# Patient Record
Sex: MALE | Race: WHITE | NOT HISPANIC OR LATINO | Employment: UNEMPLOYED | URBAN - METROPOLITAN AREA
[De-identification: names, ages, dates, MRNs, and addresses within clinical notes are randomized per-mention and may not be internally consistent; named-entity substitution may affect disease eponyms.]

---

## 2017-08-16 ENCOUNTER — HOSPITAL ENCOUNTER (EMERGENCY)
Facility: MEDICAL CENTER | Age: 47
End: 2017-08-16
Attending: EMERGENCY MEDICINE
Payer: MEDICAID

## 2017-08-16 VITALS
TEMPERATURE: 98.9 F | HEIGHT: 70 IN | WEIGHT: 170 LBS | OXYGEN SATURATION: 93 % | HEART RATE: 98 BPM | DIASTOLIC BLOOD PRESSURE: 69 MMHG | RESPIRATION RATE: 16 BRPM | SYSTOLIC BLOOD PRESSURE: 100 MMHG | BODY MASS INDEX: 24.34 KG/M2

## 2017-08-16 DIAGNOSIS — F10.920 ALCOHOL INTOXICATION, UNCOMPLICATED (HCC): ICD-10-CM

## 2017-08-16 DIAGNOSIS — R45.4 FEELING ANGRY: ICD-10-CM

## 2017-08-16 PROCEDURE — 99283 EMERGENCY DEPT VISIT LOW MDM: CPT

## 2017-08-16 ASSESSMENT — ENCOUNTER SYMPTOMS
FEVER: 0
ABDOMINAL PAIN: 0
FLANK PAIN: 0
VOMITING: 0

## 2017-08-16 ASSESSMENT — LIFESTYLE VARIABLES: SUBSTANCE_ABUSE: 1

## 2017-08-17 NOTE — ED NOTES
"Augustus Brumfield  47 y.o.  Male BIB EMS with   Chief Complaint   Patient presents with   • Alcohol Intoxication     PT denies any medical complaints, admits to alcohol use today.  In NAD.  /69 mmHg  Pulse 98  Temp(Src) 37.2 °C (98.9 °F)  Resp 16  Ht 1.778 m (5' 10\")  Wt 77.111 kg (170 lb)  BMI 24.39 kg/m2  SpO2 93%  Will continue to monitor.   "
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
## 2017-10-30 ENCOUNTER — HOSPITAL ENCOUNTER (EMERGENCY)
Dept: HOSPITAL 8 - ED | Age: 47
Discharge: LEFT BEFORE DISCHARGE | End: 2017-10-30
Payer: MEDICAID

## 2017-10-30 VITALS — HEIGHT: 70 IN | WEIGHT: 163.14 LBS | BODY MASS INDEX: 23.36 KG/M2

## 2017-10-30 VITALS — DIASTOLIC BLOOD PRESSURE: 66 MMHG | SYSTOLIC BLOOD PRESSURE: 110 MMHG

## 2017-10-30 DIAGNOSIS — L02.415: Primary | ICD-10-CM

## 2017-10-30 PROCEDURE — 99281 EMR DPT VST MAYX REQ PHY/QHP: CPT

## 2017-11-13 ENCOUNTER — HOSPITAL ENCOUNTER (EMERGENCY)
Facility: MEDICAL CENTER | Age: 47
End: 2017-11-13
Payer: MEDICAID

## 2017-11-13 VITALS
DIASTOLIC BLOOD PRESSURE: 73 MMHG | HEART RATE: 101 BPM | RESPIRATION RATE: 18 BRPM | TEMPERATURE: 98.6 F | HEIGHT: 70 IN | SYSTOLIC BLOOD PRESSURE: 122 MMHG | OXYGEN SATURATION: 99 %

## 2017-11-13 PROCEDURE — 302449 STATCHG TRIAGE ONLY (STATISTIC)

## 2017-12-16 ENCOUNTER — HOSPITAL ENCOUNTER (EMERGENCY)
Dept: HOSPITAL 8 - ED | Age: 47
Discharge: HOME | End: 2017-12-16
Payer: MEDICAID

## 2017-12-16 VITALS — WEIGHT: 175.27 LBS | BODY MASS INDEX: 25.09 KG/M2 | HEIGHT: 70 IN

## 2017-12-16 VITALS — DIASTOLIC BLOOD PRESSURE: 67 MMHG | SYSTOLIC BLOOD PRESSURE: 116 MMHG

## 2017-12-16 DIAGNOSIS — M25.562: ICD-10-CM

## 2017-12-16 DIAGNOSIS — M25.561: ICD-10-CM

## 2017-12-16 DIAGNOSIS — G89.29: Primary | ICD-10-CM

## 2017-12-16 PROCEDURE — 99283 EMERGENCY DEPT VISIT LOW MDM: CPT

## 2024-09-13 NOTE — ED NOTES
Problem: Respiratory Impairment - Respiratory Therapy 253  Intervention: Inhaled medication delivery  Note: Intervention Status  Done  Intervention: Respiratory support devices  Note: Intervention Status  Done      "Pt sat up off [employer] yelling \"I need to get the fuck out of here! I'm a 47 year old man! I need to go get a job at [employer]\"  PT grabbed belongings and stormed out of the department via ambulance bay.   ERP aware.  "